# Patient Record
Sex: MALE | Race: WHITE
[De-identification: names, ages, dates, MRNs, and addresses within clinical notes are randomized per-mention and may not be internally consistent; named-entity substitution may affect disease eponyms.]

---

## 2019-06-10 ENCOUNTER — HOSPITAL ENCOUNTER (OUTPATIENT)
Dept: HOSPITAL 35 - RAD | Age: 74
End: 2019-06-10
Attending: FAMILY MEDICINE
Payer: COMMERCIAL

## 2019-06-10 DIAGNOSIS — M43.8X6: ICD-10-CM

## 2019-06-10 DIAGNOSIS — R10.2: ICD-10-CM

## 2019-06-10 DIAGNOSIS — M47.816: Primary | ICD-10-CM

## 2019-06-14 ENCOUNTER — HOSPITAL ENCOUNTER (OUTPATIENT)
Dept: HOSPITAL 35 - MRI | Age: 74
End: 2019-06-14
Attending: FAMILY MEDICINE
Payer: COMMERCIAL

## 2019-06-14 DIAGNOSIS — M47.816: ICD-10-CM
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
## 2019-06-14 DIAGNOSIS — M48.062: ICD-10-CM

## 2019-06-14 DIAGNOSIS — M51.26: ICD-10-CM

## 2019-06-14 DIAGNOSIS — S32.020A: ICD-10-CM
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
## 2019-06-14 DIAGNOSIS — S32.010A: Primary | ICD-10-CM
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
## 2019-06-20 ENCOUNTER — HOSPITAL ENCOUNTER (OUTPATIENT)
Dept: HOSPITAL 35 - SPEC | Age: 74
Discharge: HOME | End: 2019-06-20
Attending: FAMILY MEDICINE
Payer: COMMERCIAL

## 2019-06-20 VITALS — WEIGHT: 242 LBS | HEIGHT: 69 IN | BODY MASS INDEX: 35.84 KG/M2

## 2019-06-20 VITALS — SYSTOLIC BLOOD PRESSURE: 130 MMHG | DIASTOLIC BLOOD PRESSURE: 55 MMHG

## 2019-06-20 DIAGNOSIS — Z90.49: ICD-10-CM

## 2019-06-20 DIAGNOSIS — C83.30: ICD-10-CM

## 2019-06-20 DIAGNOSIS — E03.9: ICD-10-CM

## 2019-06-20 DIAGNOSIS — E78.5: ICD-10-CM

## 2019-06-20 DIAGNOSIS — M54.9: ICD-10-CM

## 2019-06-20 DIAGNOSIS — E11.9: ICD-10-CM

## 2019-06-20 DIAGNOSIS — G47.30: ICD-10-CM

## 2019-06-20 DIAGNOSIS — M80.08XA: Primary | ICD-10-CM

## 2019-06-20 DIAGNOSIS — Z79.899: ICD-10-CM

## 2019-06-20 DIAGNOSIS — Z79.4: ICD-10-CM

## 2019-06-20 DIAGNOSIS — Z98.890: ICD-10-CM

## 2019-06-20 LAB
ANION GAP SERPL CALC-SCNC: 9 MMOL/L (ref 7–16)
APTT BLD: 25.9 SECONDS (ref 24.5–32.8)
BUN SERPL-MCNC: 13 MG/DL (ref 7–18)
CALCIUM SERPL-MCNC: 8.8 MG/DL (ref 8.5–10.1)
CHLORIDE SERPL-SCNC: 105 MMOL/L (ref 98–107)
CO2 SERPL-SCNC: 28 MMOL/L (ref 21–32)
CREAT SERPL-MCNC: 1.2 MG/DL (ref 0.7–1.3)
ERYTHROCYTE [DISTWIDTH] IN BLOOD BY AUTOMATED COUNT: 13.3 % (ref 10.5–14.5)
GLUCOSE SERPL-MCNC: 85 MG/DL (ref 74–106)
HCT VFR BLD CALC: 35.9 % (ref 42–52)
HGB BLD-MCNC: 12 GM/DL (ref 14–18)
INR PPP: 1
MCH RBC QN AUTO: 32.8 PG (ref 26–34)
MCHC RBC AUTO-ENTMCNC: 33.4 G/DL (ref 28–37)
MCV RBC: 98.1 FL (ref 80–100)
PLATELET # BLD: 270 THOU/UL (ref 150–400)
POTASSIUM SERPL-SCNC: 4 MMOL/L (ref 3.5–5.1)
PROTHROMBIN TIME: 10.7 SECONDS (ref 9.3–11.4)
RBC # BLD AUTO: 3.66 MIL/UL (ref 4.5–6)
SODIUM SERPL-SCNC: 142 MMOL/L (ref 136–145)
WBC # BLD AUTO: 6.9 THOU/UL (ref 4–11)

## 2019-07-10 ENCOUNTER — HOSPITAL ENCOUNTER (OUTPATIENT)
Dept: HOSPITAL 35 - PAIN | Age: 74
End: 2019-07-10
Attending: ANESTHESIOLOGY
Payer: COMMERCIAL

## 2019-07-10 VITALS — BODY MASS INDEX: 35.55 KG/M2 | HEIGHT: 69.02 IN | WEIGHT: 240 LBS

## 2019-07-10 VITALS — DIASTOLIC BLOOD PRESSURE: 78 MMHG | SYSTOLIC BLOOD PRESSURE: 125 MMHG

## 2019-07-10 DIAGNOSIS — S32.010D: Primary | ICD-10-CM

## 2019-07-10 DIAGNOSIS — S32.020D: ICD-10-CM
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
## 2019-07-10 DIAGNOSIS — M47.816: ICD-10-CM

## 2019-07-24 ENCOUNTER — HOSPITAL ENCOUNTER (OUTPATIENT)
Dept: HOSPITAL 35 - PAIN | Age: 74
End: 2019-07-24
Attending: ANESTHESIOLOGY
Payer: COMMERCIAL

## 2019-07-24 VITALS — DIASTOLIC BLOOD PRESSURE: 54 MMHG | SYSTOLIC BLOOD PRESSURE: 119 MMHG

## 2019-07-24 VITALS — BODY MASS INDEX: 35.7 KG/M2 | HEIGHT: 69.02 IN | WEIGHT: 241 LBS

## 2019-07-24 DIAGNOSIS — Z79.4: ICD-10-CM
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
## 2019-07-24 DIAGNOSIS — S32.019A: Primary | ICD-10-CM

## 2019-07-24 DIAGNOSIS — Z79.899: ICD-10-CM
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
## 2019-07-24 DIAGNOSIS — S32.029A: ICD-10-CM

## 2019-07-24 DIAGNOSIS — M51.36: ICD-10-CM

## 2019-07-24 NOTE — NUR
Pain Clinic Assessment:
 
1. History of Osteoarthritis:
B/L SHOULDERS
B/L HIPS
SPINE
   History of Rheumatoid Arthritis:
NONE
 
2. Height: 5 ft. 9 in. 175.3 cm.
   Weight: 241.0 lb.  oz. 109.317 kg.
   Patient's BMI: 35.6
 
3. Vital Signs:
   BP: 119/54 Pulse: 72 Resp: 14
   Temp:  02 Sat: 97 ECG Mon:
 
4. Pain Intensity: 2-8
 
5. Fall Risk:
   Dizziness: N  Needs help standing or walking: N
   Fallen in the last 3 months: N
   Fall risk comments:
 
 
6. Patient on Blood Thinner: None
 
7. History of Hypertension: N
 
8. Opioid Therapy greater than 6 weeks:
   Opiate Contract Signed:
 
9. Risk Assessment Tool Provided: LOW
 
10. Functional Assessment Tool: 36/70
 
11. Recreational Drug Use: Never Drug Type:
    Tobacco Use: Never Smoker Tobacco Type:
       Amount or Packs/day:  How Many Years:
    Alcohol Use: No  Frequency:  Quant:

## 2019-08-06 ENCOUNTER — HOSPITAL ENCOUNTER (OUTPATIENT)
Dept: HOSPITAL 35 - RAD | Age: 74
End: 2019-08-06
Attending: ANESTHESIOLOGY
Payer: COMMERCIAL

## 2019-08-06 DIAGNOSIS — M47.815: Primary | ICD-10-CM

## 2019-08-06 DIAGNOSIS — M43.8X6: ICD-10-CM

## 2019-08-07 ENCOUNTER — HOSPITAL ENCOUNTER (OUTPATIENT)
Dept: HOSPITAL 35 - PAIN | Age: 74
End: 2019-08-07
Attending: ANESTHESIOLOGY
Payer: COMMERCIAL

## 2019-08-07 VITALS — SYSTOLIC BLOOD PRESSURE: 112 MMHG | DIASTOLIC BLOOD PRESSURE: 57 MMHG

## 2019-08-07 VITALS — BODY MASS INDEX: 35.52 KG/M2 | WEIGHT: 239.8 LBS | HEIGHT: 69.02 IN

## 2019-08-07 DIAGNOSIS — M48.53XA: Primary | ICD-10-CM

## 2019-08-07 DIAGNOSIS — M51.36: ICD-10-CM

## 2019-08-07 NOTE — NUR
Pain Clinic Assessment:
 
1. History of Osteoarthritis:
B/L SHOULDERS
B/L HIPS
SPINE
   History of Rheumatoid Arthritis:
NONE
 
2. Height: 5 ft. 9 in. 175.3 cm.
   Weight: 239.8 lb.  oz. 108.773 kg.
   Patient's BMI: 35.4
 
3. Vital Signs:
   BP: 112/57 Pulse: 70 Resp: 18
   Temp:  02 Sat: 100 ECG Mon:
 
4. Pain Intensity: 2 TODAY
 
5. Fall Risk:
   Dizziness: N  Needs help standing or walking: N
   Fallen in the last 3 months: N
   Fall risk comments:
 
 
6. Patient on Blood Thinner: None
 
7. History of Hypertension: N
 
8. Opioid Therapy greater than 6 weeks:
   Opiate Contract Signed:
 
9. Risk Assessment Tool Provided: LOW
 
10. Functional Assessment Tool: 36/70
 
11. Recreational Drug Use: Never Drug Type:
    Tobacco Use: Never Smoker Tobacco Type:
       Amount or Packs/day:  How Many Years:
    Alcohol Use: No  Frequency:  Quant:

## 2019-08-13 NOTE — HPC
[facility]
[place] [place]   [address]                     PAIN MANAGEMENT CONSULTATION  
_______________________________________________________________________________
 
Name:       ESTRELLA WONG               Room #:                     [facility]#:      [id number]                       Account #:      [id number]  
Admission:  08/07/19    Attend Phys:    Michael Sood DO  
Discharge:              Date of Birth:  [birth year]  
                                                          Report #: [id number]
                                                                    [id number]   
_______________________________________________________________________________
THIS REPORT FOR:   //name//                          
 
CC: Michael Longo MD
 
DATE OF SERVICE:  08/07/2019
 
 
CHIEF COMPLAINT:  Mid back pain.
 
HISTORY OF PRESENT ILLNESS:  As you know, the patient is a very pleasant
74-year-old male who sustained a fall from a ladder on 06/05/2019.  He sustained
an L1 compression fracture of the superior endplate and then L2 compression
fracture of the superior endplate.  He underwent vertebral augmentation, which
successfully stabilized the L1 fracture, but unfortunately did not stabilize the
L2 fracture and patient continued to experience pain.  We saw the patient in
consultation.  We reviewed the kyphoplasty procedure.  Imaging which did note
positioning of the glue within the L2 fracture affecting the wrong endplate. 
Unfortunately, this precludes us from providing a repeat procedure to try to
stabilize the endplate that was fractured at the L2 level.  We have now placed
the patient in a TLSO brace.  He has been in the bracing system for
approximately 4 weeks.  He returns today to review the serial x-ray to determine
if ultimately he is healing successfully.  We placed the patient in a TLSO brace
to decrease the compressive forces on the anterior surface of the vertebral body
at L2 placing the weight upon the posterior elements, which are unfractured and
much more stable.  From a pain standpoint, he has begun to improve.  He is now
indicating he has been able to go about most of his activities of daily living
without pain.  He has not needed much in the way of pain medications.  His pain
today 2/10.  He returns to discuss the findings of this new x-ray he received
today per our request and discussed treatment from this point forward.  He
denies any new injury, trauma or any changes in medical history since our last
visit.
 
ALLERGIES:  No known drug allergies.
 
CURRENT MEDICATIONS:  See extensive list in chart.
 
SOCIAL HISTORY:  The patient denies tobacco, alcohol, IV or illicit drug use. 
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  He is unaccompanied today.
 
IMAGING:  X-ray of the lumbar spine obtained 08/06/2019 shows L1-L2 vertebral
compression deformities demonstrated.  There are no other vertebral deformities
identified.  Degenerative changes throughout the lumbar spine, which are typical
age-related findings.  Overall alignment is unchanged from prior study.  There
 
 
 
[address]   [address]                     PAIN MANAGEMENT CONSULTATION  
_______________________________________________________________________________
 
Name:       ESTRELLA WONG GARY               Room #:                     [facility]      [id number]                       Account #:      [id number]  
Admission:  08/07/19    Attend Phys:    Michael Sood DO  
Discharge:              Date of Birth:  [birth year]  
                                                          Report #: [id number]
                                                                    [id number]   
_______________________________________________________________________________
does appear to be some corticalization of both the L1 and L2 level at the
fracture sites.
 
PQRS:  The patient has no known osteoarthritic changes of the bilateral
shoulders, bilateral hips and lumbar spine.  No rheumatoid arthritis.  He is
placing pain today at 2/10.  He is not a fall risk, but has had a fall from the
ladder within 3 months.  He is on blood thinners.  He has not reported being
treated for hypertension.  He is on opioids, but not for an extended period of
time.  He has a low opiate addiction potential.  He is placing pain impact score
today, no greater than 36/70, moderate interference of daily activities
secondary to pain.
 
PHYSICAL EXAMINATION:
VITAL SIGNS:  Blood pressure 112/57, pulse is 70, respiratory rate 18 and
unlabored.  The patient is 100% on room air.  Height 5 feet 9 inches tall,
weight 239.8 pounds, and BMI calculated 35.4.
GENERAL:  Well-developed, well-nourished, well-hydrated exogenously obese
74-year-old male, appears stated age, placing current pain score 2/10.
HEENT:  Normocephalic, atraumatic.  Pupils equal, round, reactive to light. 
Extraocular muscles are intact.  Sclerae nonicteric without injection.
NEUROLOGIC:  Cranial nerves 2-12 grossly intact.  Speech fluent.
EXTREMITIES:  Show no clubbing, no cyanosis, no edema.
MUSCULOSKELETAL:  The patient remains in a TLSO brace.  The bracing system
appears well-fitted.  He is tolerating the brace well.  There is some palpatory
tenderness over the paraspinal musculature of lower lumbar spine.  No spinous
process tenderness.  There is no ecchymosis or changes in skin color, texture
overlying fracture site.
 
ASSESSMENT:
1.  Traumatic L1 compression fracture.
2.  Traumatic L2 compression fracture with unsuccessful osteoplastic procedure.
3.  Chronic axial back pain.
4.  Degeneration of lumbar spine.
 
PLAN:
1.  The patient has returned today in followup visit where we have reviewed the
x-ray requested to further evaluate the L1-L2 vertebral bodies and the healing
of the fracture.  It does appear the patient is starting to see some
corticalization of the bony structure over the fracture site.  I am pleased to
see that there has been no change in the compression deformities from the time
of his last x-ray and today's x-ray.  This indicates that offloading the weight
has been successful with the TLSO bracing system.  At this point, I do not feel
that he has completed his corticalization of these two vertebral bodies and
recommend a continued 2-week TLSO bracing.  At the end of that 2 weeks if
corticalization continues and there is no further compression, then I would
recommend the patient to come out of the TLSO bracing on a periodic basis. 
 
 
 
[facility]
[address]
[place]   [address]                     PAIN MANAGEMENT CONSULTATION  
_______________________________________________________________________________
 
Name:       WONGESTRELLA               Room #:                     REG [facility]#:      [id number]                       Account #:      [id number]  
Admission:  08/07/19    Attend Phys:    Michael Sood DO  
Discharge:              Date of Birth:  [birth year]  
                                                          Report #: [id number]
                                                                    [id number]   
_______________________________________________________________________________
Healing process typically takes somewhere between 6-8 weeks, but this is based
on individual patient healing.  We need to monitor this carefully so that we do
not have further compression.  The patient is very excited about his
improvement.  His pain has improved significantly over the past couple of days. 
He does feel he is healing well.
2.  No medication changes made at today's visit.  The patient will continue
current medical therapy as previously prescribed.
3.  A prescription for AP and lateral x-ray imaging of the lumbar spine was
provided today to undergo on imaging 08/21/2019.  At that time, patient can
contact our clinic or he can make a followup visit with us to review the
findings.  If corticalization continues and healing process appears to be nearly
complete, I would recommend he come out of the TLSO brace on a p.r.n. basis.  If
he is continuing to do well, he can then come out of the brace entirely.  This
will be dependent on the x-ray imaging.
4.  We will keep you apprised the patient's response to treatment.  I am pleased
he is doing well and will keep you apprised of his next x-ray imaging and plan
for treatment.
 
 
 
 
 
 
 
 
 
 
 
 
 
 
 
 
 
 
 
 
 
 
 
 
 
 
 
  <ELECTRONICALLY SIGNED>
   By: Michael Sood DO          
  08/13/19     1547
D: 08/08/19 0803                           _____________________________________
T: 08/09/19 0037                           Michael Sood DO            /nt

## 2019-08-21 ENCOUNTER — HOSPITAL ENCOUNTER (OUTPATIENT)
Dept: HOSPITAL 35 - RAD | Age: 74
End: 2019-08-21
Payer: COMMERCIAL

## 2019-08-21 DIAGNOSIS — M51.36: Primary | ICD-10-CM

## 2019-10-14 ENCOUNTER — HOSPITAL ENCOUNTER (OUTPATIENT)
Dept: HOSPITAL 35 - NUC | Age: 74
End: 2019-10-14
Attending: FAMILY MEDICINE
Payer: COMMERCIAL

## 2019-10-14 DIAGNOSIS — S63.592A: Primary | ICD-10-CM
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
## 2019-10-14 DIAGNOSIS — M65.4: ICD-10-CM
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
## 2019-10-14 DIAGNOSIS — M81.0: ICD-10-CM

## 2020-02-25 NOTE — NUR
Pain Clinic Assessment:
 
1. History of Osteoarthritis:
B/L SHOULDERS
B/L HIPS
SPINE
   History of Rheumatoid Arthritis:
NONE
 
2. Height: 5 ft. 9 in. 175.3 cm.
   Weight: 240.0 lb.  oz. 108.864 kg.
   Patient's BMI: 35.4
 
3. Vital Signs:
   BP: 125/78 Pulse: 61 Resp: 16
   Temp:  02 Sat: 97 ECG Mon:
 
4. Pain Intensity: 5
 
5. Fall Risk:
   Dizziness: N  Needs help standing or walking: N
   Fallen in the last 3 months: Y
   Fall risk comments:
 
 
6. Patient on Blood Thinner: None
 
7. History of Hypertension: N
 
8. Opioid Therapy greater than 6 weeks:
   Opiate Contract Signed:
 
9. Risk Assessment Tool Provided: LOW
 
10. Functional Assessment Tool: 36/70
 
11. Recreational Drug Use: Never Drug Type:
    Tobacco Use: Never Smoker Tobacco Type:
       Amount or Packs/day:  How Many Years:
    Alcohol Use: No  Frequency:  Quant: no

## 2020-11-20 ENCOUNTER — HOSPITAL ENCOUNTER (OUTPATIENT)
Dept: HOSPITAL 35 - MRI | Age: 75
End: 2020-11-20
Attending: FAMILY MEDICINE
Payer: COMMERCIAL

## 2020-11-20 DIAGNOSIS — M71.21: Primary | ICD-10-CM

## 2020-11-20 DIAGNOSIS — M17.11: ICD-10-CM

## 2021-06-30 ENCOUNTER — HOSPITAL ENCOUNTER (OUTPATIENT)
Dept: HOSPITAL 35 - ULTRA | Age: 76
End: 2021-06-30
Attending: PODIATRIST
Payer: COMMERCIAL

## 2021-06-30 DIAGNOSIS — M79.604: ICD-10-CM

## 2021-06-30 DIAGNOSIS — M79.89: ICD-10-CM

## 2021-06-30 DIAGNOSIS — M79.605: Primary | ICD-10-CM

## 2021-07-16 ENCOUNTER — HOSPITAL ENCOUNTER (OUTPATIENT)
Dept: HOSPITAL 35 - SJCVC | Age: 76
End: 2021-07-16
Attending: INTERNAL MEDICINE
Payer: COMMERCIAL

## 2021-07-16 DIAGNOSIS — E78.00: ICD-10-CM

## 2021-07-16 DIAGNOSIS — G47.33: ICD-10-CM

## 2021-07-16 DIAGNOSIS — R94.31: Primary | ICD-10-CM

## 2021-07-16 DIAGNOSIS — R06.09: ICD-10-CM

## 2021-07-16 DIAGNOSIS — I44.1: ICD-10-CM

## 2021-07-16 DIAGNOSIS — F32.9: ICD-10-CM

## 2021-07-16 DIAGNOSIS — Z79.4: ICD-10-CM

## 2021-07-16 DIAGNOSIS — R60.9: ICD-10-CM

## 2021-07-16 DIAGNOSIS — K21.9: ICD-10-CM

## 2021-07-16 DIAGNOSIS — E66.01: ICD-10-CM

## 2021-07-16 DIAGNOSIS — Z72.89: ICD-10-CM

## 2021-07-16 DIAGNOSIS — E78.5: ICD-10-CM

## 2021-07-16 DIAGNOSIS — I45.10: ICD-10-CM

## 2021-07-16 DIAGNOSIS — Z79.899: ICD-10-CM

## 2021-07-16 DIAGNOSIS — E11.8: ICD-10-CM

## 2021-07-16 DIAGNOSIS — E07.89: ICD-10-CM

## 2021-07-16 DIAGNOSIS — Z99.89: ICD-10-CM

## 2021-07-29 ENCOUNTER — HOSPITAL ENCOUNTER (OUTPATIENT)
Dept: HOSPITAL 35 - HYPER | Age: 76
End: 2021-07-29
Attending: EMERGENCY MEDICINE
Payer: COMMERCIAL

## 2021-07-29 DIAGNOSIS — K21.9: ICD-10-CM

## 2021-07-29 DIAGNOSIS — F41.9: ICD-10-CM

## 2021-07-29 DIAGNOSIS — I89.0: Primary | ICD-10-CM

## 2021-07-29 DIAGNOSIS — Z79.4: ICD-10-CM

## 2021-07-29 DIAGNOSIS — M19.90: ICD-10-CM

## 2021-07-29 DIAGNOSIS — E11.9: ICD-10-CM

## 2021-08-11 ENCOUNTER — HOSPITAL ENCOUNTER (OUTPATIENT)
Dept: HOSPITAL 35 - SJCVCIMAG | Age: 76
End: 2021-08-11
Attending: INTERNAL MEDICINE
Payer: COMMERCIAL

## 2021-08-11 DIAGNOSIS — R06.00: ICD-10-CM

## 2021-08-11 DIAGNOSIS — Z79.899: ICD-10-CM

## 2021-08-11 DIAGNOSIS — Z90.49: ICD-10-CM

## 2021-08-11 DIAGNOSIS — Z79.4: ICD-10-CM

## 2021-08-11 DIAGNOSIS — I11.9: ICD-10-CM

## 2021-08-11 DIAGNOSIS — E78.00: ICD-10-CM

## 2021-08-11 DIAGNOSIS — M19.90: ICD-10-CM

## 2021-08-11 DIAGNOSIS — I45.10: ICD-10-CM

## 2021-08-11 DIAGNOSIS — K21.9: ICD-10-CM

## 2021-08-11 DIAGNOSIS — G47.30: ICD-10-CM

## 2021-08-11 DIAGNOSIS — I49.3: ICD-10-CM

## 2021-08-11 DIAGNOSIS — R60.9: ICD-10-CM

## 2021-08-11 DIAGNOSIS — I07.1: Primary | ICD-10-CM

## 2021-08-11 DIAGNOSIS — I77.89: ICD-10-CM

## 2021-08-11 DIAGNOSIS — E78.5: ICD-10-CM

## 2021-08-11 DIAGNOSIS — Z82.49: ICD-10-CM

## 2021-08-11 DIAGNOSIS — E11.9: ICD-10-CM

## 2021-08-11 DIAGNOSIS — R07.89: ICD-10-CM

## 2021-08-16 ENCOUNTER — HOSPITAL ENCOUNTER (OUTPATIENT)
Dept: HOSPITAL 35 - HYPER | Age: 76
End: 2021-08-16
Attending: PREVENTIVE MEDICINE
Payer: COMMERCIAL

## 2021-08-16 DIAGNOSIS — R60.1: ICD-10-CM

## 2021-08-16 DIAGNOSIS — F41.9: ICD-10-CM

## 2021-08-16 DIAGNOSIS — M19.90: ICD-10-CM

## 2021-08-16 DIAGNOSIS — I89.0: Primary | ICD-10-CM

## 2021-08-16 DIAGNOSIS — Z98.890: ICD-10-CM

## 2021-08-16 DIAGNOSIS — Z79.4: ICD-10-CM

## 2021-08-16 DIAGNOSIS — G89.29: ICD-10-CM

## 2021-08-16 DIAGNOSIS — Z79.899: ICD-10-CM

## 2021-08-16 DIAGNOSIS — E11.9: ICD-10-CM

## 2021-08-30 ENCOUNTER — HOSPITAL ENCOUNTER (OUTPATIENT)
Dept: HOSPITAL 35 - HYPER | Age: 76
End: 2021-08-30
Attending: PREVENTIVE MEDICINE
Payer: COMMERCIAL

## 2021-08-30 DIAGNOSIS — K21.9: ICD-10-CM

## 2021-08-30 DIAGNOSIS — M19.90: ICD-10-CM

## 2021-08-30 DIAGNOSIS — E11.9: ICD-10-CM

## 2021-08-30 DIAGNOSIS — Z79.4: ICD-10-CM

## 2021-08-30 DIAGNOSIS — R60.1: ICD-10-CM

## 2021-08-30 DIAGNOSIS — G89.29: ICD-10-CM

## 2021-08-30 DIAGNOSIS — F41.9: ICD-10-CM

## 2021-08-30 DIAGNOSIS — I89.0: Primary | ICD-10-CM

## 2021-09-13 ENCOUNTER — HOSPITAL ENCOUNTER (OUTPATIENT)
Dept: HOSPITAL 35 - CAT | Age: 76
End: 2021-09-13
Attending: INTERNAL MEDICINE
Payer: COMMERCIAL

## 2021-09-13 DIAGNOSIS — I25.10: ICD-10-CM

## 2021-09-13 DIAGNOSIS — Z13.6: Primary | ICD-10-CM

## 2021-09-13 DIAGNOSIS — E78.00: ICD-10-CM

## 2021-09-20 ENCOUNTER — HOSPITAL ENCOUNTER (OUTPATIENT)
Dept: HOSPITAL 35 - HYPER | Age: 76
End: 2021-09-20
Attending: PREVENTIVE MEDICINE
Payer: COMMERCIAL

## 2021-09-20 DIAGNOSIS — E11.9: ICD-10-CM

## 2021-09-20 DIAGNOSIS — K21.9: ICD-10-CM

## 2021-09-20 DIAGNOSIS — M19.90: ICD-10-CM

## 2021-09-20 DIAGNOSIS — F41.9: ICD-10-CM

## 2021-09-20 DIAGNOSIS — G89.29: ICD-10-CM

## 2021-09-20 DIAGNOSIS — R60.1: ICD-10-CM

## 2021-09-20 DIAGNOSIS — Z79.4: ICD-10-CM

## 2021-09-20 DIAGNOSIS — I89.0: Primary | ICD-10-CM

## 2021-10-25 ENCOUNTER — HOSPITAL ENCOUNTER (OUTPATIENT)
Dept: HOSPITAL 35 - HYPER | Age: 76
End: 2021-10-25
Attending: PREVENTIVE MEDICINE
Payer: COMMERCIAL

## 2021-10-25 DIAGNOSIS — M19.90: ICD-10-CM

## 2021-10-25 DIAGNOSIS — Z79.4: ICD-10-CM

## 2021-10-25 DIAGNOSIS — F41.9: ICD-10-CM

## 2021-10-25 DIAGNOSIS — R60.1: ICD-10-CM

## 2021-10-25 DIAGNOSIS — G89.29: ICD-10-CM

## 2021-10-25 DIAGNOSIS — I89.0: Primary | ICD-10-CM

## 2021-10-25 DIAGNOSIS — K21.9: ICD-10-CM

## 2021-10-25 DIAGNOSIS — E11.9: ICD-10-CM

## 2021-11-29 ENCOUNTER — HOSPITAL ENCOUNTER (OUTPATIENT)
Dept: HOSPITAL 35 - HYPER | Age: 76
End: 2021-11-29
Attending: EMERGENCY MEDICINE
Payer: COMMERCIAL

## 2021-11-29 DIAGNOSIS — Z98.890: ICD-10-CM

## 2021-11-29 DIAGNOSIS — E11.9: ICD-10-CM

## 2021-11-29 DIAGNOSIS — I89.0: Primary | ICD-10-CM

## 2021-11-29 DIAGNOSIS — M19.90: ICD-10-CM

## 2021-11-29 DIAGNOSIS — Z79.899: ICD-10-CM

## 2021-11-29 DIAGNOSIS — R60.1: ICD-10-CM

## 2021-11-29 DIAGNOSIS — F41.9: ICD-10-CM

## 2021-11-29 DIAGNOSIS — G89.29: ICD-10-CM

## 2021-11-29 DIAGNOSIS — Z79.4: ICD-10-CM

## 2022-01-13 ENCOUNTER — HOSPITAL ENCOUNTER (OUTPATIENT)
Dept: HOSPITAL 35 - HYPER | Age: 77
End: 2022-01-13
Attending: PREVENTIVE MEDICINE
Payer: COMMERCIAL

## 2022-01-13 DIAGNOSIS — K21.9: ICD-10-CM

## 2022-01-13 DIAGNOSIS — I89.0: Primary | ICD-10-CM

## 2022-01-13 DIAGNOSIS — E11.9: ICD-10-CM

## 2022-01-13 DIAGNOSIS — M19.90: ICD-10-CM

## 2022-01-13 DIAGNOSIS — Z79.4: ICD-10-CM

## 2022-01-13 DIAGNOSIS — F41.9: ICD-10-CM

## 2022-01-13 DIAGNOSIS — R60.1: ICD-10-CM

## 2022-01-13 DIAGNOSIS — G89.29: ICD-10-CM

## 2022-02-22 ENCOUNTER — HOSPITAL ENCOUNTER (OUTPATIENT)
Dept: HOSPITAL 35 - SJCVC | Age: 77
End: 2022-02-22
Attending: INTERNAL MEDICINE
Payer: COMMERCIAL

## 2022-02-22 DIAGNOSIS — R60.9: ICD-10-CM

## 2022-02-22 DIAGNOSIS — Z79.4: ICD-10-CM

## 2022-02-22 DIAGNOSIS — Z72.89: ICD-10-CM

## 2022-02-22 DIAGNOSIS — Z79.899: ICD-10-CM

## 2022-02-22 DIAGNOSIS — I45.19: ICD-10-CM

## 2022-02-22 DIAGNOSIS — E78.00: ICD-10-CM

## 2022-02-22 DIAGNOSIS — R93.1: ICD-10-CM

## 2022-02-22 DIAGNOSIS — I10: ICD-10-CM

## 2022-02-22 DIAGNOSIS — R94.31: Primary | ICD-10-CM

## 2022-02-22 DIAGNOSIS — K21.9: ICD-10-CM

## 2022-02-22 DIAGNOSIS — Z82.49: ICD-10-CM

## 2022-02-22 DIAGNOSIS — E78.5: ICD-10-CM

## 2022-02-22 DIAGNOSIS — R06.00: ICD-10-CM

## 2022-02-22 DIAGNOSIS — E11.8: ICD-10-CM

## 2022-02-22 DIAGNOSIS — G47.33: ICD-10-CM
